# Patient Record
Sex: FEMALE | Race: OTHER | HISPANIC OR LATINO | URBAN - METROPOLITAN AREA
[De-identification: names, ages, dates, MRNs, and addresses within clinical notes are randomized per-mention and may not be internally consistent; named-entity substitution may affect disease eponyms.]

---

## 2019-06-28 ENCOUNTER — EMERGENCY (EMERGENCY)
Facility: HOSPITAL | Age: 44
LOS: 0 days | Discharge: HOME | End: 2019-06-28
Admitting: STUDENT IN AN ORGANIZED HEALTH CARE EDUCATION/TRAINING PROGRAM
Payer: MEDICAID

## 2019-06-28 VITALS
HEART RATE: 66 BPM | RESPIRATION RATE: 18 BRPM | TEMPERATURE: 97 F | SYSTOLIC BLOOD PRESSURE: 125 MMHG | DIASTOLIC BLOOD PRESSURE: 64 MMHG | OXYGEN SATURATION: 100 %
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
## 2019-06-28 DIAGNOSIS — M79.642 PAIN IN LEFT HAND: ICD-10-CM

## 2019-06-28 DIAGNOSIS — M79.641 PAIN IN RIGHT HAND: ICD-10-CM
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
## 2019-06-28 DIAGNOSIS — M25.461 EFFUSION, RIGHT KNEE: ICD-10-CM

## 2019-06-28 DIAGNOSIS — S69.90XA UNSPECIFIED INJURY OF UNSPECIFIED WRIST, HAND AND FINGER(S), INITIAL ENCOUNTER: ICD-10-CM

## 2019-06-28 PROCEDURE — 73130 X-RAY EXAM OF HAND: CPT | Mod: 26,50

## 2019-06-28 PROCEDURE — 73562 X-RAY EXAM OF KNEE 3: CPT | Mod: 26,RT

## 2019-06-28 PROCEDURE — 99283 EMERGENCY DEPT VISIT LOW MDM: CPT

## 2019-06-28 NOTE — ED PROVIDER NOTE - CARE PROVIDERS DIRECT ADDRESSES
[email or web address]

## 2019-06-28 NOTE — ED PROVIDER NOTE - NS ED ROS FT
Constitutional: (-) fever  Eyes/ENT: (-) blurry vision, (-) epistaxis  Cardiovascular: (-) chest pain, (-) syncope  Respiratory: (-) cough, (-) shortness of breath  Gastrointestinal: (-) vomiting, (-) diarrhea  : (-) dysuria, (-) hematuria  Musculoskeletal: (+) joint pain, (-) neck pain, (-) back pain  Integumentary: (-) rash, (-) edema  Neurological: (-) headache, (-) altered mental status  Allergic/Immunologic: (-) pruritus

## 2019-06-28 NOTE — ED PROVIDER NOTE - PHYSICAL EXAMINATION
CONST: Well appearing in NAD  EYES: PERRL, EOMI, Sclera and conjunctiva clear.   ENT: No nasal discharge. Oropharynx normal appearing, no erythema or exudates. No abscess or swelling. Uvula midline.   NECK: Non-tender, no meningeal signs. normal ROM. supple   CARD: S1 S2; No jvd  RESP: Equal BS B/L, No wheezes, rhonchi or rales. No distress  GI: Soft, non-tender, non-distended. normal BS  MS: Swelling of R 1st pip, tenderness with rom of pip joints of b/l hand. R knee superficial effusion. Normal ROM in all extremities. pulses 2 +.   SKIN: Warm, dry, no acute rashes. Good turgor  NEURO: A&Ox3, No focal deficits. Strength 5/5 with no sensory deficits. Steady gait.

## 2019-06-28 NOTE — ED PROVIDER NOTE - NSFOLLOWUPINSTRUCTIONS_ED_ALL_ED_FT
Follow up with specialist provided below and PMD in 1-2 days.    Arthritis    Arthritis is a term that is commonly used to refer to joint pain or joint disease. There are more than 100 types of arthritis.    CAUSES  The most common cause of this condition is wear and tear of a joint. Other causes include:    Gout.  Inflammation of a joint.  An infection of a joint.  Sprains and other injuries near the joint.  A drug reaction or allergic reaction.    In some cases, the cause may not be known.    SYMPTOMS  The main symptom of this condition is pain in the joint with movement. Other symptoms include:    Redness, swelling, or stiffness at a joint.  Warmth coming from the joint.  Fever.  Overall feeling of illness.    DIAGNOSIS  This condition may be diagnosed with a physical exam and tests, including:    Blood tests.  Urine tests.  Imaging tests, such as MRI, X-rays, or a CT scan.    Sometimes, fluid is removed from a joint for testing.    TREATMENT  Treatment for this condition may involve:    Treatment of the cause, if it is known.  Rest.  Raising (elevating) the joint.  Applying cold or hot packs to the joint.  Medicines to improve symptoms and reduce inflammation.  Injections of a steroid such as cortisone into the joint to help reduce pain and inflammation.    Depending on the cause of your arthritis, you may need to make lifestyle changes to reduce stress on your joint. These changes may include exercising more and losing weight.    HOME CARE INSTRUCTIONS  Medicines    Take over-the-counter and prescription medicines only as told by your health care provider.  Do not take aspirin to relieve pain if gout is suspected.    Activities    Rest your joint if told by your health care provider. Rest is important when your disease is active and your joint feels painful, swollen, or stiff.  Avoid activities that make the pain worse. It is important to balance activity with rest.  Exercise your joint regularly with range-of-motion exercises as told by your health care provider. Try doing low-impact exercise, such as:  Swimming.  Water aerobics.  Biking.  Walking.    Joint Care    If your joint is swollen, keep it elevated if told by your health care provider.   If your joint feels stiff in the morning, try taking a warm shower.  If directed, apply heat to the joint. If you have diabetes, do not apply heat without permission from your health care provider.  Put a towel between the joint and the hot pack or heating pad.  Leave the heat on the area for 20–30 minutes.  If directed, apply ice to the joint:  Put ice in a plastic bag.  Place a towel between your skin and the bag.  Leave the ice on for 20 minutes, 2–3 times per day.  Keep all follow-up visits as told by your health care provider. This is important.    SEEK MEDICAL CARE IF:  The pain gets worse.  You have a fever.    SEEK IMMEDIATE MEDICAL CARE IF:  You develop severe joint pain, swelling, or redness.  Many joints become painful and swollen.  You develop severe back pain.  You develop severe weakness in your leg.  You cannot control your bladder or bowels.    ADDITIONAL NOTES AND INSTRUCTIONS    Please follow up with your Primary MD in 24-48 hr.  Seek immediate medical care for any new/worsening signs or symptoms.

## 2019-06-28 NOTE — ED ADULT TRIAGE NOTE - CHIEF COMPLAINT QUOTE
phone offered, refused. son translate. bilateral hand pain for one month ,alert and in no distress.

## 2019-06-28 NOTE — ED PROVIDER NOTE - CARE PROVIDER_API CALL
Nam Jacobs)  Internal Medicine; Rheumatology  [address] [facility], [place] [address]  Phone: [phone]  Fax: [phone]  Follow Up Time:     Gunnar Aquino)  Orthopaedic Surgery  [address] [place] [address]  Phone: [phone]  Fax: [phone]  Follow Up Time:

## 2019-06-28 NOTE — ED PROVIDER NOTE - OBJECTIVE STATEMENT
43y F no pmh present for evaluation of joint pain. Pt states she has been having progressively worsening mild/moderate joint pain in her b/l hand at PIP and R knee, relieved with aleve, aggravated at work as a maid. Associated swelling. Denies fever, trauma, weakness, numbness